# Patient Record
Sex: FEMALE | Race: WHITE | NOT HISPANIC OR LATINO | Employment: OTHER | URBAN - METROPOLITAN AREA
[De-identification: names, ages, dates, MRNs, and addresses within clinical notes are randomized per-mention and may not be internally consistent; named-entity substitution may affect disease eponyms.]

---

## 2019-11-22 ENCOUNTER — OFFICE VISIT (OUTPATIENT)
Dept: CARDIOLOGY | Facility: CLINIC | Age: 79
End: 2019-11-22

## 2019-11-22 VITALS
HEIGHT: 62 IN | WEIGHT: 168.8 LBS | HEART RATE: 64 BPM | OXYGEN SATURATION: 96 % | BODY MASS INDEX: 31.06 KG/M2 | SYSTOLIC BLOOD PRESSURE: 157 MMHG | DIASTOLIC BLOOD PRESSURE: 78 MMHG

## 2019-11-22 DIAGNOSIS — E78.5 DYSLIPIDEMIA: ICD-10-CM

## 2019-11-22 DIAGNOSIS — I10 ESSENTIAL HYPERTENSION: ICD-10-CM

## 2019-11-22 DIAGNOSIS — Z98.61 HISTORY OF PTCA: ICD-10-CM

## 2019-11-22 DIAGNOSIS — I25.10 CORONARY ARTERY DISEASE INVOLVING NATIVE CORONARY ARTERY OF NATIVE HEART WITHOUT ANGINA PECTORIS: Primary | ICD-10-CM

## 2019-11-22 PROCEDURE — 93000 ELECTROCARDIOGRAM COMPLETE: CPT | Performed by: INTERNAL MEDICINE

## 2019-11-22 PROCEDURE — 99213 OFFICE O/P EST LOW 20 MIN: CPT | Performed by: INTERNAL MEDICINE

## 2019-11-22 RX ORDER — CLOPIDOGREL BISULFATE 75 MG/1
1 TABLET ORAL DAILY
COMMUNITY
Start: 2013-09-26

## 2019-11-22 RX ORDER — ASPIRIN 81 MG/1
1 TABLET, CHEWABLE ORAL DAILY
COMMUNITY

## 2019-11-22 RX ORDER — ISOSORBIDE MONONITRATE 60 MG/1
1 TABLET, EXTENDED RELEASE ORAL
COMMUNITY
Start: 2013-09-26

## 2019-11-22 RX ORDER — AMLODIPINE BESYLATE 5 MG/1
TABLET ORAL DAILY
COMMUNITY
Start: 2019-09-26

## 2019-11-22 RX ORDER — NITROGLYCERIN 0.4 MG/1
TABLET SUBLINGUAL
COMMUNITY
Start: 2013-09-26

## 2019-11-22 RX ORDER — DICYCLOMINE HCL 20 MG
TABLET ORAL 4 TIMES DAILY
COMMUNITY
Start: 2019-11-04

## 2019-11-22 RX ORDER — ACETAMINOPHEN 325 MG/1
1 TABLET ORAL 2 TIMES DAILY
COMMUNITY

## 2019-11-22 RX ORDER — OMEPRAZOLE 20 MG/1
20 CAPSULE, DELAYED RELEASE ORAL DAILY
COMMUNITY

## 2019-11-22 RX ORDER — METOPROLOL SUCCINATE 50 MG/1
1 TABLET, EXTENDED RELEASE ORAL DAILY
COMMUNITY
Start: 2016-12-16

## 2019-11-22 RX ORDER — SIMVASTATIN 10 MG
TABLET ORAL DAILY
COMMUNITY
Start: 2019-09-10

## 2019-11-22 RX ORDER — SUCRALFATE 1 G/1
TABLET ORAL 3 TIMES DAILY
COMMUNITY
Start: 2019-10-01

## 2019-11-22 RX ORDER — CHROMIUM 200 MCG
TABLET ORAL DAILY
COMMUNITY

## 2019-11-22 NOTE — PROGRESS NOTES
"Dear Brian     CC: Followup for CAD, HTN, Dyslipidemia     Mrs. Ai Ryan is a very pleasant 78 years old lady  a retired nurse, with history of coronary artery disease, status post PCI stenting of 2 vessels in 2006.  Myocardial perfusion imaging study done in June 2015 was negative for inducible myocardial   ischemia..       /78   Pulse 64 Comment: Sinus  Ht 157.5 cm (62\")   Wt 76.6 kg (168 lb 12.8 oz)   SpO2 96%   BMI 30.87 kg/m²    Blood pressure at home is doing very well.  She offers no complaints of chest pain dyspnea or palpitations.      Indication for EKG: Coronary artery disease previous PCI stenting.    Normal sinus rhythm with a rate of 64 bpm low precordial voltage.  Probable old inferolateral myocardial infarction early transition.  no change as compared to previous EKG of 5/17/2019.  NH interval 162 ms QRS duration 100 ms  ms and QRS Axis XV.      Her lipid profile is being checked through your office. HShe is on simvastatin 20 mg   and also takes amlodipine and there is interaction and her simvastatin was decreased to 10 mg daily during her last visit in May 2019 because of potential interaction with amlodipine.  We will get the  lipid report from your office  and decide whether she i s a candidate for  Vascepa  for secondary prevention     A/P     1- CAD  previous PCI stenting.  No anginal symptoms.     2- Dyslipidemia will get  lipid profile and decid e about  use of Vascepa     She is stable from cardiac standpoint.   We will see  her in the office in 6 months for follow-up.     Thank you very much for allowing us to participate in the care of your patients.             Problems: Active problems were reviewed with the patient during this visit.  Medications: Medications were reviewed with the patient during this visit.  Allergies: Allergies were reviewed with the patient during this visit.            Past Medical History:     Reviewed history from 12/05/2014 and no changes " required:        Myocardial Infarction: 2006        Acute Renal Failure        Anemia        Coronary Artery Disease        Hyperlipidemia        Hypertension        Chronic kidney Disease        Anorexia-most likely due to medications        Fractured left wrist 2014     Past Surgical History:     Reviewed history from 2014 and no changes required:        P T C A/Stentin; Mid left circ & LAD with Cypher stent        Knee Surgery Left 2009        Total Abdominal Hysterectomy        Kidney Stones Removal      Active Medications (reviewed today):  SIMVASTATIN 10 MG ORAL TABLET (SIMVASTATIN) Take once a day by mouth.  * ALLERGY INJECTIONS Injection right and left arms weekly  ADULT ASPIRIN EC LOW STRENGTH 81 MG ORAL TABLET DELAYED RELEASE (ASPIRIN) Take 1 tablet by mouth daily  ZYRTEC ALLERGY 10 MG ORAL CAPSULE (CETIRIZINE HCL) Take 1 tablet by mouth daily  MONTELUKAST SODIUM 10 MG ORAL TABLET (MONTELUKAST SODIUM) Take 1 tablet by mouth daily  METOPROLOL SUCCINATE ER 50 MG ORAL TABLET EXTENDED RELEASE 24 HOUR (METOPROLOL SUCCINATE) one and a half tablet daily  AMLODIPINE BESYLATE 5 MG ORAL TABLET (AMLODIPINE BESYLATE) Take 1 tablet by mouth daily  CARAFATE 1 GM ORAL TABLET (SUCRALFATE) Take one (1) tablet by mouth three times a day  BENTYL 20 MG ORAL TABLET (DICYCLOMINE HCL) Take one (1) tablet by mouth four times a day  ISOSORBIDE MONONITRATE ER 60 MG ORAL TABLET EXTENDED RELEASE 24 HOUR (ISOSORBIDE MONONITRATE) Take 1- 1/2  tablet by mouth daily  Total (90mg)  L-LYSINE 500 MG ORAL TABLET (LYSINE) One to two tabs daily  PLAVIX 75 MG ORAL TABLET (CLOPIDOGREL BISULFATE) Take 1 tablet by mouth daily  NITROSTAT 0.4 MG SUBLINGUAL TABLET SUBLINGUAL (NITROGLYCERIN) Take as directed     Current Allergies (reviewed today):  * IVP DYE (Moderate)  * TETANUS (Moderate)  CRESTOR (ROSUVASTATIN CALCIUM TABS) (Moderate)  LIPITOR (Moderate)     Family History Summary:      Reviewed history Last on 10/19/2018 and no  changes required:05/17/2019        General Comments - FH:  FH Heart Diseas-Parents, Brother  FH Diabetes-Father  FH Stroke-Stroke        Social History:     Reviewed history from 04/20/2018 and no changes required:        Marital Status:          Children: 1        Retired            Risk Factors:      Smoked Tobacco Use:  Never smoker  Passive smoke exposure:  no  Drug use:  no  HIV high-risk behavior:  no  Caffeine use:  0 drinks per day  Alcohol use:  no  Exercise:  no  Seatbelt use:  100 %  Sun Exposure:  occasionally     Family History Risk Factors:     Family History of MI in females < 65 years old:  no     Family History of MI in males < 55 years old:  no           Review of Systems   General: denies fevers, chills, sweats, anorexia, fatigue, malaise, weight loss  Eyes: denies blurring, diplopia, irritation, discharge, vision loss, eye pain, photophobia  Cardiovascular: Coronary artery disease. 2 vessel PCI stenting in 2006. History of hypertension.  Respiratory: No cough. Progressively worsening dyspnea on exertion over the past 2 months  Gastrointestinal: Gastroesophageal reflux disease  Genitourinary: History of Vesicocele  Musculoskeletal: denies back pain, joint pain, joint swelling, muscle cramps, muscle weakness, stiffness, arthritis  Neurologic: denies transient paralysis, weakness, paresthesias, seizures, syncope, tremors, vertigo        Physical Exam     General:      well developed, well nourished, in no acute distress.    Neck:      no masses, thyromegaly, or abnormal cervical nodes.   no JVD. No carotid bruits  Lungs:      clear bilaterally to auscultation.    Heart:      non-displaced PMI, chest non-tender; regular rate and rhythm, S1, S2 without murmurs, rubs, or gallops  Pulses:      pulses normal in all 4 extremities.    Extremities:       noted